# Patient Record
Sex: FEMALE | URBAN - METROPOLITAN AREA
[De-identification: names, ages, dates, MRNs, and addresses within clinical notes are randomized per-mention and may not be internally consistent; named-entity substitution may affect disease eponyms.]

---

## 2019-11-27 ENCOUNTER — IMPORTED ENCOUNTER (OUTPATIENT)
Dept: URBAN - METROPOLITAN AREA CLINIC 31 | Facility: CLINIC | Age: 79
End: 2019-11-27

## 2019-11-27 PROBLEM — H00.025: Noted: 2019-11-27

## 2019-11-27 PROCEDURE — 99203 OFFICE O/P NEW LOW 30 MIN: CPT

## 2019-11-27 NOTE — PATIENT DISCUSSION
Opened with 25G needle. Drained with CTA. Start E-mycin [place] for an appointment in [address] with Dr[address] Danielle Salgado for Office Call Established Pt.

## 2019-12-04 ENCOUNTER — IMPORTED ENCOUNTER (OUTPATIENT)
Dept: URBAN - METROPOLITAN AREA CLINIC 31 | Facility: CLINIC | Age: 79
End: 2019-12-04

## 2019-12-04 PROBLEM — H00.15: Noted: 2019-12-04

## 2019-12-04 PROCEDURE — 99213 OFFICE O/P EST LOW 20 MIN: CPT

## 2019-12-04 NOTE — PATIENT DISCUSSION
Chalazion left lower eyelid - RESOLVED AFTER DRAINAGE FEW DAYS AGO. f/u - routine with Dr Shannan Kimble

## 2022-04-02 ASSESSMENT — VISUAL ACUITY
OU_SC: 20/30
OD_CC: 20/25-3
OD_SC: 20/40
OS_CC: 20/40-3
OS_SC: 20/40